# Patient Record
Sex: FEMALE | Race: WHITE
[De-identification: names, ages, dates, MRNs, and addresses within clinical notes are randomized per-mention and may not be internally consistent; named-entity substitution may affect disease eponyms.]

---

## 2022-12-31 ENCOUNTER — HOSPITAL ENCOUNTER (EMERGENCY)
Dept: HOSPITAL 33 - ED | Age: 22
LOS: 1 days | Discharge: HOME | End: 2023-01-01
Payer: COMMERCIAL

## 2022-12-31 DIAGNOSIS — Z3A.09: ICD-10-CM

## 2022-12-31 DIAGNOSIS — O23.41: Primary | ICD-10-CM

## 2022-12-31 DIAGNOSIS — R10.2: ICD-10-CM

## 2022-12-31 DIAGNOSIS — Z28.310: ICD-10-CM

## 2022-12-31 DIAGNOSIS — R35.0: ICD-10-CM

## 2022-12-31 DIAGNOSIS — N39.0: ICD-10-CM

## 2022-12-31 DIAGNOSIS — O20.9: ICD-10-CM

## 2022-12-31 LAB
ABO GROUP BLD: (no result)
ALBUMIN SERPL-MCNC: 4.1 G/DL (ref 3.5–5)
ALP SERPL-CCNC: 51 U/L (ref 38–126)
ALT SERPL-CCNC: 11 U/L (ref 0–35)
ANION GAP SERPL CALC-SCNC: 9.3 MEQ/L (ref 5–15)
AST SERPL QL: 19 U/L (ref 14–36)
BASOPHILS # BLD AUTO: 0.02 X10^3/UL (ref 0–0.4)
BILIRUB BLD-MCNC: 0.9 MG/DL (ref 0.2–1.3)
BUN SERPL-MCNC: 9 MG/DL (ref 7–17)
CALCIUM SPEC-MCNC: 8.8 MG/DL (ref 8.4–10.2)
CHLORIDE SERPL-SCNC: 105 MMOL/L (ref 98–107)
CO2 SERPL-SCNC: 23 MMOL/L (ref 22–30)
CREAT SERPL-MCNC: 0.49 MG/DL (ref 0.52–1.04)
EOSINOPHIL # BLD AUTO: 0.03 X10^3/UL (ref 0–0.5)
GFR SERPLBLD BASED ON 1.73 SQ M-ARVRAT: > 60 ML/MIN
GLUCOSE SERPL-MCNC: 145 MG/DL (ref 74–106)
GLUCOSE UR-MCNC: >=1000 MG/DL
HCT VFR BLD AUTO: 34.9 % (ref 35–47)
HGB BLD-MCNC: 11.4 G/DL (ref 12–16)
LYMPHOCYTES # SPEC AUTO: 1.34 X10^3/UL (ref 1–4.6)
MCH RBC QN AUTO: 28.3 PG (ref 26–32)
MCHC RBC AUTO-ENTMCNC: 32.7 G/DL (ref 32–36)
MONOCYTES # BLD AUTO: 0.26 X10^3/UL (ref 0–1.3)
PLATELET # BLD AUTO: 214 X10^3/UL (ref 150–450)
POTASSIUM SERPLBLD-SCNC: 3.1 MMOL/L (ref 3.5–5.1)
PROT SERPL-MCNC: 7.5 G/DL (ref 6.3–8.2)
PROT UR STRIP-MCNC: NEGATIVE MG/DL
RBC # BLD AUTO: 4.03 X10^6/UL (ref 4.1–5.4)
RBC # UR AUTO: NEGATIVE ERY/UL (ref 0–5)
RBC #/AREA URNS HPF: (no result) /HPF (ref 0–2)
RH BLD: POSITIVE
SODIUM SERPL-SCNC: 134 MMOL/L (ref 137–145)
UA DIPSTICK PNL UR: (no result)
URINE CULTURED INDICATED?: YES
WBC # BLD AUTO: 8.1 X10^3/UL (ref 4–10.5)
WBC #/AREA URNS HPF: (no result) /HPF (ref 0–5)

## 2022-12-31 PROCEDURE — 86901 BLOOD TYPING SEROLOGIC RH(D): CPT

## 2022-12-31 PROCEDURE — 84702 CHORIONIC GONADOTROPIN TEST: CPT

## 2022-12-31 PROCEDURE — 87086 URINE CULTURE/COLONY COUNT: CPT

## 2022-12-31 PROCEDURE — 81015 MICROSCOPIC EXAM OF URINE: CPT

## 2022-12-31 PROCEDURE — 85025 COMPLETE CBC W/AUTO DIFF WBC: CPT

## 2022-12-31 PROCEDURE — 80053 COMPREHEN METABOLIC PANEL: CPT

## 2022-12-31 PROCEDURE — 86850 RBC ANTIBODY SCREEN: CPT

## 2022-12-31 PROCEDURE — 87186 SC STD MICRODIL/AGAR DIL: CPT

## 2022-12-31 PROCEDURE — 96360 HYDRATION IV INFUSION INIT: CPT

## 2022-12-31 PROCEDURE — 36000 PLACE NEEDLE IN VEIN: CPT

## 2022-12-31 PROCEDURE — 86900 BLOOD TYPING SEROLOGIC ABO: CPT

## 2022-12-31 PROCEDURE — 96365 THER/PROPH/DIAG IV INF INIT: CPT

## 2022-12-31 PROCEDURE — 36415 COLL VENOUS BLD VENIPUNCTURE: CPT

## 2022-12-31 PROCEDURE — 87077 CULTURE AEROBIC IDENTIFY: CPT

## 2022-12-31 PROCEDURE — 99284 EMERGENCY DEPT VISIT MOD MDM: CPT

## 2022-12-31 PROCEDURE — 76801 OB US < 14 WKS SINGLE FETUS: CPT

## 2022-12-31 NOTE — ERPHSYRPT
- History of Present Illness


Time Seen by Provider: 22 20:40


Source: patient


Exam Limitations: no limitations


Patient Subjective Stated Complaint: pt states she has been having some 

intermittent vaginal bleeding and cramping for approx last 2 week. states 

yesterday she passed a clot and today woke up with cramping which got worse 

throughout the day. states according to her hcg levels she was told she was 

approx 10 weeks pregnant


Triage Nursing Assessment: pt alert and oriented, answers questions approp. pt 

ambualtoryw ith steady gait noted. respirations nonlabored. skin pink warm and 

dry.


Physician History: 





22 years old  2 para 1 at almost 5 weeks gestation per LMP presented in 

the ER with chief complaint of pelvic pain and vaginal bleeding/spotting off and

on for the last couple of weeks and having some clots earlier today.  She is 

also complaining of increased urinary frequency.  Patient does not have 

ultrasound done yet with primary OB.


Timing/Duration: week(s), gradual onset, worse


Activites at Onset: rest


Quality: cramping


Onset Location: pelvic pain


Pain Radiation: none


Severity of Pain-Max: moderate


Severity of Pain-Current: mild


Modifying Factors: Improves With: nothing


Associated Symptoms: urinary frequency, pregnant


Allergies/Adverse Reactions: 








No Known Drug Allergies Allergy (Verified 22 20:57)


   





Hx Tetanus, Diphtheria Vaccination/Date Given: Yes


Hx Influenza Vaccination/Date Given: No


Hx Pneumococcal Vaccination/Date Given: No





Travel Risk





- International Travel


Have you traveled outside of the country in past 3 weeks: No





- Coronavirus Screening


Are you exhibiting any of the following symptoms?: No


Close contact with a COVID-19 positive Pt in past 14-21 Days: No





- Vaccine Status


Have you recieved a Covid-19 vaccination: No





- Review of Systems


Constitutional: No Symptoms


Eyes: No Symptoms


Ears, Nose, & Throat: No Symptoms


Respiratory: No Symptoms


Cardiac: No Symptoms


Abdominal/Gastrointestinal: No Symptoms


Genitourinary Symptoms: Pregnancy, Vaginal Bleeding


Musculoskeletal: No Symptoms


Skin: No Symptoms


Neurological: No Symptoms


Endocrine: No Symptoms


Hematologic/Lymphatic: No Symptoms


Immunological/Allergic: No Symptoms





- Past Medical History


Pertinent Past Medical History: No





- Past Surgical History


Past Surgical History: Yes


Female Surgical History:  Section





- Social History


Smoking Status: Never smoker


Exposure to second hand smoke: No


Drug Use: none


Patient Lives Alone: Yes





- Female History


Hx Last Menstrual Period: unsure


Hx Pregnant Now: Yes


Gestational Age: approx 10





- Nursing Vital Signs


Nursing Vital Signs: 


                               Initial Vital Signs











Temperature  98.4 F   22 20:42


 


Pulse Rate  81   22 20:42


 


Respiratory Rate  16   22 20:42


 


Blood Pressure  112/65   22 20:42


 


O2 Sat by Pulse Oximetry  97   22 20:42








                                   Pain Scale











Pain Intensity                 1

















- Physical Exam


General Appearance: no apparent distress, alert


Eye Exam: PERRL/EOMI


Ears, Nose, Throat Exam: normal ENT inspection, pharynx normal


Neck Exam: normal inspection, full range of motion


Respiratory Exam: normal breath sounds, lungs clear


Cardiovascular Exam: regular rate/rhythm, normal heart sounds


Gastrointestinal/Abdomen Exam: soft, normal bowel sounds, No tenderness


Back Exam: normal inspection


Extremity Exam: normal inspection, normal range of motion


Neurologic Exam: alert, oriented x 3, cooperative


Skin Exam: normal color


**SpO2 Interpretation**: normal


SpO2: 99


O2 Delivery: Room Air


Ordered Tests: 


                               Active Orders 24 hr











 Category Date Time Status


 


 IV Insertion STAT Care  22 20:55 Active


 


 NPO (ED) STAT Care  22 20:55 Active


 


 OB <14 WKS 1ST GESTATION [US] Stat Exams  22 23:46 Taken


 


 CBC W DIFF Stat Lab  22 20:50 Completed


 


 CMP Stat Lab  22 20:50 Completed


 


 CULTURE,URINE Stat Lab  22 20:57 Received


 


 HCG, Quantitative (Inhouse) Stat Lab  22 20:50 Completed


 


 UA W/RFX CULTURE Stat Lab  22 20:57 Completed








Medication Summary














Discontinued Medications














Generic Name Dose Route Start Last Admin





  Trade Name North  PRN Reason Stop Dose Admin


 


Sodium Chloride  1,000 mls @ 999 mls/hr  22 20:55  22 22:00





  Sodium Chloride 0.9% 1000 Ml  IV  22 21:55  Infused





  .Q1H1M STA   Infusion


 


Sodium Chloride  Confirm  22 20:58 





  Sodium Chloride 0.9% 1000 Ml  Administered  22 20:59 





  Dose  





  1,000 mls @ ud  





  .ROUTE  





  .STK-MED ONE  


 


Ceftriaxone Sodium/Dextrose  2 g in 50 mls @ 100 mls/hr  22 21:51  

22 22:28





  Rocephin 2 Gm-D5w 50ml Bag**  IV  22 22:20  Infused





  STAT STA   Infusion


 


Ceftriaxone Sodium/Dextrose  Confirm  22 21:56 





  Rocephin 2 Gm-D5w 50ml Bag**  Administered  22 21:57 





  Dose  





  2 g in 50 mls @ ud  





  IV  





  .STK-MED ONE  


 


Potassium Chloride  40 meq  22 21:55  22 21:58





  Potassium Chloride Tab 10 Meq Tab  PO  22 21:56  40 meq





  STAT ONE   Administration


 


Potassium Chloride  Confirm  22 21:56 





  Potassium Chloride Tab 10 Meq Tab  Administered  22 21:57 





  Dose  





  40 meq  





  PO  





  .STK-MED ONE  











Lab/Rad Data: 


                           Laboratory Result Diagrams





                                 22 20:50 





                                 22 20:50 





                               Laboratory Results











  22 Range/Units





  20:57 20:50 20:50 


 


WBC     (4.0-10.5)  x10^3/uL


 


RBC     (4.1-5.4)  x10^6/uL


 


Hgb     (12.0-16.0)  g/dL


 


Hct     (35-47)  %


 


MCV     ()  fL


 


MCH     (26-32)  pg


 


MCHC     (32-36)  g/dL


 


RDW     (11.5-14.0)  %


 


Plt Count     (150-450)  x10^3/uL


 


MPV     (7.5-11.0)  fL


 


Gran %     (36.0-66.0)  %


 


Immature Gran % (Auto)     (0.00-0.4)  %


 


Nucleat RBC Rel Count     (0.00-0.1)  %


 


Eos # (Auto)     (0-0.5)  x10^3/uL


 


Immature Gran # (Auto)     (0.00-0.03)  x10^3u/L


 


Absolute Lymphs (auto)     (1.0-4.6)  x10^3/uL


 


Absolute Monos (auto)     (0.0-1.3)  x10^3/uL


 


Absolute Nucleated RBC     (0.00-0.01)  x10^3u/L


 


Lymphocytes %     (24.0-44.0)  %


 


Monocytes %     (0.0-12.0)  %


 


Eosinophils %     (0.00-5.0)  %


 


Basophils %     (0.0-0.4)  %


 


Absolute Granulocytes     (1.4-6.9)  x10^3/uL


 


Basophils #     (0-0.4)  x10^3/uL


 


Sodium     (137-145)  mmol/L


 


Potassium     (3.5-5.1)  mmol/L


 


Chloride     ()  mmol/L


 


Carbon Dioxide     (22-30)  mmol/L


 


Anion Gap     (5-15)  MEQ/L


 


BUN     (7-17)  mg/dL


 


Creatinine     (0.52-1.04)  mg/dL


 


Estimated GFR     ML/MIN


 


Glucose     ()  mg/dL


 


Calcium     (8.4-10.2)  mg/dL


 


Total Bilirubin     (0.2-1.3)  mg/dL


 


AST     (14-36)  U/L


 


ALT     (0-35)  U/L


 


Alkaline Phosphatase     ()  U/L


 


Serum Total Protein     (6.3-8.2)  g/dL


 


Albumin     (3.5-5.0)  g/dL


 


Beta HCG, Quant   370171   mIU/ml


 


Urinalys Dipstick Clnc  MAIN LAB    


 


Urine Color  YELLOW    (YELLOW)  


 


Urine Appearance  SLIGHTLY CLOUDY A    (CLEAR)  


 


Urine pH  6.5    (5-6)  


 


Ur Specific Gravity  1.025    (1.005-1.025)  


 


POC Urine Protein Conf  NEGATIVE    (Negative)  


 


Urine Ketones  TRACE A    (NEGATIVE)  


 


Urine Nitrite  NEGATIVE    (NEGATIVE)  


 


Urine Bilirubin  NEGATIVE    (NEGATIVE)  


 


Urine Urobilinogen  0.2    (0-1)  mg/dL


 


Urine Leukocytes  SMALL A    (NEGATIVE)  


 


Urine WBC (Auto)   A    (0-5)  /HPF


 


Urine RBC (Auto)  6-10 A    (0-2)  /HPF


 


U Hyaline Cast (Auto)  0-2    (0-2)  /LPF


 


U Epithel Cells (Auto)  RARE    (FEW)  /HPF


 


Urine Bacteria (Auto)  NONE    (NEGATIVE)  /HPF


 


Urine RBC  NEGATIVE    (0-5)  Jeramy/ul


 


Urine Mucus (Auto)  SLIGHT A    (NEGATIVE)  /HPF


 


Ur Culture Indicated?  YES    


 


Urine Glucose  >=1000 A    (NEGATIVE)  mg/dL


 


ABO Group    O  


 


Rh Factor    POSITIVE  


 


Antibody Screen    NEGATIVE  (NEGATIVE)  














  22 Range/Units





  20:50 20:50 


 


WBC   8.1  (4.0-10.5)  x10^3/uL


 


RBC   4.03 L  (4.1-5.4)  x10^6/uL


 


Hgb   11.4 L  (12.0-16.0)  g/dL


 


Hct   34.9 L  (35-47)  %


 


MCV   86.6  ()  fL


 


MCH   28.3  (26-32)  pg


 


MCHC   32.7  (32-36)  g/dL


 


RDW   13.6  (11.5-14.0)  %


 


Plt Count   214  (150-450)  x10^3/uL


 


MPV   10.8  (7.5-11.0)  fL


 


Gran %   79.4 H  (36.0-66.0)  %


 


Immature Gran % (Auto)   0.2  (0.00-0.4)  %


 


Nucleat RBC Rel Count   0.0  (0.00-0.1)  %


 


Eos # (Auto)   0.03  (0-0.5)  x10^3/uL


 


Immature Gran # (Auto)   0.02  (0.00-0.03)  x10^3u/L


 


Absolute Lymphs (auto)   1.34  (1.0-4.6)  x10^3/uL


 


Absolute Monos (auto)   0.26  (0.0-1.3)  x10^3/uL


 


Absolute Nucleated RBC   0.00  (0.00-0.01)  x10^3u/L


 


Lymphocytes %   16.6 L  (24.0-44.0)  %


 


Monocytes %   3.2  (0.0-12.0)  %


 


Eosinophils %   0.4  (0.00-5.0)  %


 


Basophils %   0.2  (0.0-0.4)  %


 


Absolute Granulocytes   6.42  (1.4-6.9)  x10^3/uL


 


Basophils #   0.02  (0-0.4)  x10^3/uL


 


Sodium  134 L   (137-145)  mmol/L


 


Potassium  3.1 L   (3.5-5.1)  mmol/L


 


Chloride  105   ()  mmol/L


 


Carbon Dioxide  23   (22-30)  mmol/L


 


Anion Gap  9.3   (5-15)  MEQ/L


 


BUN  9   (7-17)  mg/dL


 


Creatinine  0.49 L   (0.52-1.04)  mg/dL


 


Estimated GFR  > 60.0   ML/MIN


 


Glucose  145 H   ()  mg/dL


 


Calcium  8.8   (8.4-10.2)  mg/dL


 


Total Bilirubin  0.90   (0.2-1.3)  mg/dL


 


AST  19   (14-36)  U/L


 


ALT  11   (0-35)  U/L


 


Alkaline Phosphatase  51   ()  U/L


 


Serum Total Protein  7.5   (6.3-8.2)  g/dL


 


Albumin  4.1   (3.5-5.0)  g/dL


 


Beta HCG, Quant    mIU/ml


 


Urinalys Dipstick Clnc    


 


Urine Color    (YELLOW)  


 


Urine Appearance    (CLEAR)  


 


Urine pH    (5-6)  


 


Ur Specific Gravity    (1.005-1.025)  


 


POC Urine Protein Conf    (Negative)  


 


Urine Ketones    (NEGATIVE)  


 


Urine Nitrite    (NEGATIVE)  


 


Urine Bilirubin    (NEGATIVE)  


 


Urine Urobilinogen    (0-1)  mg/dL


 


Urine Leukocytes    (NEGATIVE)  


 


Urine WBC (Auto)    (0-5)  /HPF


 


Urine RBC (Auto)    (0-2)  /HPF


 


U Hyaline Cast (Auto)    (0-2)  /LPF


 


U Epithel Cells (Auto)    (FEW)  /HPF


 


Urine Bacteria (Auto)    (NEGATIVE)  /HPF


 


Urine RBC    (0-5)  Jeramy/ul


 


Urine Mucus (Auto)    (NEGATIVE)  /HPF


 


Ur Culture Indicated?    


 


Urine Glucose    (NEGATIVE)  mg/dL


 


ABO Group    


 


Rh Factor    


 


Antibody Screen    (NEGATIVE)  














- Progress


Progress: improved


Air Movement: good


Progress Note: 





22 23:48


22 years old is evaluated for pelvic cramping with vaginal bleeding.  She is 

given fluids and symptomatic treatment, on reevaluation feeling much better.  

She does have UTI and given dose of antibiotics.  I have obtained ultrasound 

which showed almost 10 weeks gestation intrauterine with fetal heart tone in 

160s and 2 small subchorionic hemorrhages.  Recommended increase hydration, 

pelvic rest and outpatient follow-up with her OB.  Does have UTI, given Rocephin

 in here and will continue with Keflex to go home.  Discussed signs symptoms of 

worsening needing return to ER which she seems understanding.  Stable for 

discharge.


22 23:57





Blood Culture(s) Obtained: No


Antibiotics given: No


Counseled pt/family regarding: lab results, diagnosis, need for follow-up, rad 

results





- Departure


Departure Disposition: Home


Clinical Impression: 


 Subchorionic hemorrhage in first trimester, Vaginal bleeding affecting early 

pregnancy, UTI in pregnancy, antepartum





Condition: Stable


Critical Care Time: No


Referrals: 


YIN AMAYA MD [Primary Care Provider] - Follow up/PCP as directed


KYMBERLY GUTIERREZ DO [ACTIVE STAFF] - Follow up/PCP as directed


Instructions:  Threatened Miscarriage (DC), Bleeding in Early Pregnancy (DC)


Additional Instructions: 


Follow-up with your OB with [facility].  Plenty of fluids to keep yourself 

well-hydrated.  Take Tylenol as needed.  Pelvic rest, return to ER for increased

vaginal bleeding, cramping, passing tissue etc.


Prescriptions: 


Cephalexin Mh 500 mg** [Keflex 500 mg**] 500 mg PO TID #21 cap

## 2023-01-01 VITALS — SYSTOLIC BLOOD PRESSURE: 100 MMHG | HEART RATE: 64 BPM | DIASTOLIC BLOOD PRESSURE: 66 MMHG | OXYGEN SATURATION: 98 %

## 2023-01-01 NOTE — XRAY
Indication: Bleeding and cramping.



Two-dimensional transabdominal early OB ultrasound performed.



Comparison: None



Single viable intrauterine pregnancy with mean crown-rump length measuring

3.21 cm corresponding to 10 weeks 1 day.  Fetal heart rate 169 bpm.  2 small

foci of subchorionic hemorrhage, largest 2.4 x 0.9 x 1.2 cm.  Cervix is

closed.  Neither ovaries are visualized.  No suspicious adnexal mass or free

fluid.



Impression: Single viable intrauterine pregnancy measuring 10 weeks 1 day.

Expected date of confinement is July 28, 2023.  2 foci subchorionic hemorrhage.



Comment: Preliminary report was given.

## 2023-04-20 ENCOUNTER — HOSPITAL ENCOUNTER (OUTPATIENT)
Dept: HOSPITAL 33 - MED SURG | Age: 23
Setting detail: OBSERVATION
Discharge: HOME | End: 2023-04-20
Attending: OBSTETRICS & GYNECOLOGY | Admitting: OBSTETRICS & GYNECOLOGY
Payer: COMMERCIAL

## 2023-04-20 VITALS — HEART RATE: 83 BPM | SYSTOLIC BLOOD PRESSURE: 114 MMHG | DIASTOLIC BLOOD PRESSURE: 71 MMHG | OXYGEN SATURATION: 100 %

## 2023-04-20 DIAGNOSIS — Z3A.25: ICD-10-CM

## 2023-04-20 DIAGNOSIS — Z34.82: Primary | ICD-10-CM

## 2023-04-20 LAB
AMPHETAMINES UR QL: NEGATIVE
BACTERIA UR CULT: NO
BARBITURATES UR QL: NEGATIVE
BENZODIAZ UR QL SCN: NEGATIVE
COCAINE UR QL SCN: NEGATIVE
METHADONE UR QL: NEGATIVE
OPIATES UR QL: NEGATIVE
PCP UR QL CFM>20 NG/ML: NEGATIVE
RBC # URNS HPF: (no result) /HPF (ref 0–5)
THC UR QL SCN: NEGATIVE

## 2023-04-20 PROCEDURE — 80307 DRUG TEST PRSMV CHEM ANLYZR: CPT

## 2023-04-20 PROCEDURE — 81001 URINALYSIS AUTO W/SCOPE: CPT

## 2023-06-10 ENCOUNTER — HOSPITAL ENCOUNTER (OUTPATIENT)
Dept: HOSPITAL 33 - OB | Age: 23
Setting detail: OBSERVATION
LOS: 1 days | Discharge: HOME | End: 2023-06-11
Attending: OBSTETRICS & GYNECOLOGY | Admitting: OBSTETRICS & GYNECOLOGY
Payer: COMMERCIAL

## 2023-06-10 DIAGNOSIS — Z34.83: Primary | ICD-10-CM

## 2023-06-10 DIAGNOSIS — Z3A.33: ICD-10-CM

## 2023-06-10 LAB
AMPHETAMINES UR QL: NEGATIVE
BACTERIA UR CULT: NO
BARBITURATES UR QL: NEGATIVE
BENZODIAZ UR QL SCN: NEGATIVE
COCAINE UR QL SCN: NEGATIVE
METHADONE UR QL: NEGATIVE
OPIATES UR QL: POSITIVE
PCP UR QL CFM>20 NG/ML: NEGATIVE
RBC # URNS HPF: (no result) /HPF (ref 0–5)
THC UR QL SCN: NEGATIVE
WBC URNS QL MICRO: (no result) /HPF (ref 0–5)

## 2023-06-10 PROCEDURE — 81001 URINALYSIS AUTO W/SCOPE: CPT

## 2023-06-10 PROCEDURE — G0379 DIRECT REFER HOSPITAL OBSERV: HCPCS

## 2023-06-10 PROCEDURE — G0378 HOSPITAL OBSERVATION PER HR: HCPCS

## 2023-06-10 PROCEDURE — 80307 DRUG TEST PRSMV CHEM ANLYZR: CPT

## 2023-06-11 VITALS — SYSTOLIC BLOOD PRESSURE: 128 MMHG | HEART RATE: 95 BPM | DIASTOLIC BLOOD PRESSURE: 68 MMHG

## 2023-06-11 VITALS — OXYGEN SATURATION: 99 %
